# Patient Record
Sex: FEMALE | Race: WHITE | NOT HISPANIC OR LATINO | Employment: FULL TIME | URBAN - METROPOLITAN AREA
[De-identification: names, ages, dates, MRNs, and addresses within clinical notes are randomized per-mention and may not be internally consistent; named-entity substitution may affect disease eponyms.]

---

## 2018-06-07 ENCOUNTER — OFFICE VISIT (OUTPATIENT)
Dept: OBGYN CLINIC | Facility: MEDICAL CENTER | Age: 18
End: 2018-06-07
Payer: COMMERCIAL

## 2018-06-07 VITALS — WEIGHT: 138.5 LBS | DIASTOLIC BLOOD PRESSURE: 60 MMHG | SYSTOLIC BLOOD PRESSURE: 110 MMHG

## 2018-06-07 DIAGNOSIS — Z30.41 SURVEILLANCE FOR BIRTH CONTROL, ORAL CONTRACEPTIVES: Primary | ICD-10-CM

## 2018-06-07 PROBLEM — Z88.9 ATOPY: Status: ACTIVE | Noted: 2018-06-07

## 2018-06-07 PROBLEM — J45.909 BRONCHIAL ASTHMA: Status: ACTIVE | Noted: 2018-06-07

## 2018-06-07 PROBLEM — L30.9 ECZEMA: Status: ACTIVE | Noted: 2018-06-07

## 2018-06-07 PROBLEM — G43.009 MIGRAINE HEADACHE WITHOUT AURA: Status: ACTIVE | Noted: 2018-06-07

## 2018-06-07 PROBLEM — J45.902 ASTHMA WITH STATUS ASTHMATICUS: Status: ACTIVE | Noted: 2018-06-07

## 2018-06-07 PROCEDURE — 99202 OFFICE O/P NEW SF 15 MIN: CPT | Performed by: NURSE PRACTITIONER

## 2018-06-07 RX ORDER — ONDANSETRON 4 MG/1
4 TABLET, FILM COATED ORAL EVERY 8 HOURS PRN
COMMUNITY

## 2018-06-07 RX ORDER — NARATRIPTAN 2.5 MG/1
2.5 TABLET ORAL ONCE AS NEEDED
COMMUNITY

## 2018-06-07 NOTE — PROGRESS NOTES
Assessment Diagnoses and all orders for this visit:    Surveillance for birth control, oral contraceptives  -     Norethin-Eth Estrad-Fe Biphas (LO LOESTRIN FE) 1 MG-10 MCG / 10 MCG TABS; Take 1 tablet by mouth daily for 84 days    Other orders  -     Discontinue: Norethin-Eth Estrad-Fe Biphas (LO LOESTRIN FE) 1 MG-10 MCG / 10 MCG TABS; Take by mouth  -     ondansetron (ZOFRAN) 4 mg tablet; Take 4 mg by mouth every 8 (eight) hours as needed for nausea or vomiting  -     naratriptan (AMERGE) 2 5 MG tablet; Take 2 5 mg by mouth once as needed for migraine 2 5 mg at onset of headache, may repeat in 4 hours if needed        Plan:  19 y o  female continuing OCP (estrogen/progesterone), no contraindications  1 Pt  Doing well on ocp, rx for the year sent to pharmacy  2 Discussed effects of vaping unknown and strongly advised her to d/c this now  3 rto in one year for annual, call sooner w any concerns  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 presents for contraception counseling  The patient does not have complaints today  The patient is sexually active  Pertinent past medical history: States she is currently vaping  In monog  Relationship for > 1 year  Had neg std testing last year, declines repeat testing now  States had at least 2 of gardasil vaccines, will contact prior ob/gyn and get records of vaccination sent to us       Patient Active Problem List   Diagnosis    Asthma    Eczema    Atopy    Surveillance for birth control, oral contraceptives    Migraine headache without aura       No past medical history on file  No past surgical history on file  No family history on file  Social History     Social History    Marital status: Single     Spouse name: N/A    Number of children: N/A    Years of education: N/A     Occupational History    Not on file       Social History Main Topics    Smoking status: Not on file    Smokeless tobacco: Not on file    Alcohol use Not on file    Drug use: Unknown    Sexual activity: Yes     Partners: Male     Birth control/ protection: OCP     Other Topics Concern    Not on file     Social History Narrative    No narrative on file          Current Outpatient Prescriptions:     naratriptan (AMERGE) 2 5 MG tablet, Take 2 5 mg by mouth once as needed for migraine 2 5 mg at onset of headache, may repeat in 4 hours if needed, Disp: , Rfl:     Norethin-Eth Estrad-Fe Biphas (LO LOESTRIN FE) 1 MG-10 MCG / 10 MCG TABS, Take 1 tablet by mouth daily for 84 days, Disp: 84 tablet, Rfl: 3    ondansetron (ZOFRAN) 4 mg tablet, Take 4 mg by mouth every 8 (eight) hours as needed for nausea or vomiting, Disp: , Rfl:     Allergies   Allergen Reactions    Amoxicillin     Penicillins        Review of Systems  Constitutional :no fever, feels well, no tiredness, no recent weight gain or loss  ENT: no ear ache, no loss of hearing, no nosebleeds or nasal discharge, no sore throat or hoarseness  Cardiovascular: no complaints of slow or fast heart beat, no chest pain, no palpitations, no leg claudication or lower extremity edema  Respiratory: no complaints of shortness of shortness of breath, no AKINS  Breasts:no complaints of breast pain, breast lump, or nipple discharge  Gastrointestinal: no complaints of abdominal pain, constipation, nausea, vomiting, or diarrhea or bloody stools  Genitourinary : no complaints of dysuria, incontinence, pelvic pain, no dysmenorrhea, vaginal discharge or abnormal vaginal bleeding and as noted in HPI  Musculoskeletal: no complaints of arthralgia, no myalgia, no joint swelling or stiffness, no limb pain or swelling    Integumentary: no complaints of skin rash or lesion, itching or dry skin  Neurological: no complaints of headache, no confusion, no numbness or tingling, no dizziness or fainting    Objective     /60   Wt 62 8 kg (138 lb 8 oz)   LMP 05/18/2018     General:   appears stated age, cooperative, alert normal mood and affect Neck: normal, supple,trachea midline, no masses   Heart: regular rate and rhythm, S1, S2 normal, no murmur, click, rub or gallop   Lungs: clear to auscultation bilaterally   Psychiatric orientation to person, place, and time: normal  mood and affect: normal